# Patient Record
Sex: MALE | Race: WHITE | NOT HISPANIC OR LATINO | Employment: FULL TIME | URBAN - NONMETROPOLITAN AREA
[De-identification: names, ages, dates, MRNs, and addresses within clinical notes are randomized per-mention and may not be internally consistent; named-entity substitution may affect disease eponyms.]

---

## 2017-02-08 DIAGNOSIS — N40.1 BPH (BENIGN PROSTATIC HYPERTROPHY) WITH URINARY OBSTRUCTION: Primary | ICD-10-CM

## 2017-02-08 DIAGNOSIS — N13.8 BPH (BENIGN PROSTATIC HYPERTROPHY) WITH URINARY OBSTRUCTION: Primary | ICD-10-CM

## 2017-02-08 LAB — PSA SERPL-MCNC: 0.45 NG/ML (ref 0–4)

## 2017-02-17 ENCOUNTER — OFFICE VISIT (OUTPATIENT)
Dept: UROLOGY | Facility: CLINIC | Age: 57
End: 2017-02-17

## 2017-02-17 VITALS
SYSTOLIC BLOOD PRESSURE: 130 MMHG | DIASTOLIC BLOOD PRESSURE: 80 MMHG | HEIGHT: 72 IN | WEIGHT: 232 LBS | TEMPERATURE: 96.7 F | BODY MASS INDEX: 31.42 KG/M2

## 2017-02-17 DIAGNOSIS — N40.1 BPH WITH URINARY OBSTRUCTION: Primary | ICD-10-CM

## 2017-02-17 DIAGNOSIS — N13.8 BPH WITH URINARY OBSTRUCTION: Primary | ICD-10-CM

## 2017-02-17 DIAGNOSIS — N43.40 SPERMATOCELE: ICD-10-CM

## 2017-02-17 LAB
BILIRUB BLD-MCNC: NEGATIVE MG/DL
CLARITY, POC: CLEAR
COLOR UR: YELLOW
GLUCOSE UR STRIP-MCNC: NEGATIVE MG/DL
KETONES UR QL: NEGATIVE
LEUKOCYTE EST, POC: NEGATIVE
NITRITE UR-MCNC: NEGATIVE MG/ML
PH UR: 6.5 [PH] (ref 5–8)
PROT UR STRIP-MCNC: NEGATIVE MG/DL
RBC # UR STRIP: NEGATIVE /UL
SP GR UR: 1.01 (ref 1–1.03)
UROBILINOGEN UR QL: NORMAL

## 2017-02-17 PROCEDURE — 81003 URINALYSIS AUTO W/O SCOPE: CPT | Performed by: UROLOGY

## 2017-02-17 PROCEDURE — 99213 OFFICE O/P EST LOW 20 MIN: CPT | Performed by: UROLOGY

## 2017-02-17 RX ORDER — MONTELUKAST SODIUM 10 MG/1
TABLET ORAL
COMMUNITY
Start: 2017-02-16

## 2017-02-17 RX ORDER — FLUTICASONE PROPIONATE 50 MCG
SPRAY, SUSPENSION (ML) NASAL
COMMUNITY
Start: 2016-11-15

## 2017-02-17 NOTE — PROGRESS NOTES
Subjective    Mr. De La Torre is 56 y.o. male    CHIEF COMPLAINT: BPH    The patient's symptoms are stable since we last saw him I he denies any gross hematuria no urinary tract infections no change in his voiding habits his AUA score today is 7.  He is not on any medications for his prostate.  His PSA today is 0.4 denies any urgency frequency nocturia etc.    He also has a spermatocele on the right hemiscrotum this is maybe slightly larger of the last year or 2 but still does not give me any pain or discomfort      History of Present Illness      The following portions of the patient's history were reviewed and updated as appropriate: allergies, current medications, past family history, past medical history, past social history, past surgical history and problem list.    Review of Systems   Constitutional: Negative for chills and fever.   Gastrointestinal: Negative for abdominal pain, anal bleeding and blood in stool.   Genitourinary: Negative for difficulty urinating, flank pain, frequency, hematuria, penile pain, penile swelling and urgency.         Current Outpatient Prescriptions:   •  fluticasone (FLONASE) 50 MCG/ACT nasal spray, , Disp: , Rfl:   •  montelukast (SINGULAIR) 10 MG tablet, , Disp: , Rfl:     Past Medical History   Diagnosis Date   • BPH (benign prostatic hypertrophy)    • Spermatocele        Past Surgical History   Procedure Laterality Date   • Colonoscopy         Social History     Social History   • Marital status: Single     Spouse name: N/A   • Number of children: N/A   • Years of education: N/A     Social History Main Topics   • Smoking status: Never Smoker   • Smokeless tobacco: None   • Alcohol use Yes   • Drug use: None   • Sexual activity: Not Asked     Other Topics Concern   • None     Social History Narrative   • None       Family History   Problem Relation Age of Onset   • Lung cancer Father    • Colon cancer Maternal Uncle    • Colon cancer Maternal Grandmother        Objective    Visit  "Vitals   • /80   • Temp 96.7 °F (35.9 °C)   • Ht 72\" (182.9 cm)   • Wt 232 lb (105 kg)   • BMI 31.46 kg/m2       Physical Exam   Constitutional: He is oriented to person, place, and time. He appears well-developed and well-nourished.   Pulmonary/Chest: Effort normal.   Abdominal: Soft. He exhibits no distension and no mass. There is no tenderness. There is no rebound and no guarding. No hernia. Hernia confirmed negative in the right inguinal area and confirmed negative in the left inguinal area.   Genitourinary: Penis normal. Rectal exam shows no mass, no tenderness and anal tone normal. Enlarged: for the age of the patient. Right testis shows mass. Right testis shows no swelling and no tenderness. Left testis shows no mass, no swelling and no tenderness. Circumcised. No hypospadias. No discharge found.   Genitourinary Comments:  The urethral meatus normal in position without evidence of stricture. Epididymis without mass or tenderness. Vas Deferens is palpably normal.Anus and perineum without mass or tenderness. The prostate is approximately 30 ml. It is Symmetric, with a Soft consistency. There are no nodules present. . The seminal vesicles are Not palpable due to the size of the prostate    There is a less than gauze ball size size right spermatocele above the testis is nontender.     Lymphadenopathy: No inguinal adenopathy noted on the right or left side.   Neurological: He is alert and oriented to person, place, and time.   Vitals reviewed.        Orders Only on 02/08/2017   Component Date Value Ref Range Status   • PSA 02/08/2017 0.450  0.000 - 4.000 ng/mL Final       Results for orders placed or performed in visit on 02/17/17   POC Urinalysis Dipstick, Automated   Result Value Ref Range    Color Yellow Yellow, Straw, Dark Yellow, Mirna    Clarity, UA Clear Clear    Glucose, UA Negative Negative, 1000 mg/dL (3+) mg/dL    Bilirubin Negative Negative    Ketones, UA Negative Negative    Specific Gravity  " 1.010 1.005 - 1.030    Blood, UA Negative Negative    pH, Urine 6.5 5.0 - 8.0    Protein, POC Negative Negative mg/dL    Urobilinogen, UA Normal Normal    Leukocytes Negative Negative    Nitrite, UA Negative Negative       Assessment and Plan    Andres was seen today for benign prostatic hypertrophy.    Diagnoses and all orders for this visit:    BPH with urinary obstruction  -     POC Urinalysis Dipstick, Automated    Spermatocele    Plan--from a BPH point review on was seen back again in 1 year with a PSA    He wondered whether this spermatocele would ever have to be operated on or not I told him really just depends weather starts giving him discomfort or gets very large size certainly many men will have this all lives did not bother them some will require surgery so really just time will tell.    I think I answered all his questions if he starts having increasing symptoms or size he will let me know    EMR Dragon/Transcription disclaimer:  Much of this encounter note is an electronic transcription/translation of spoken language to printed text. The electronic translation of spoken language may permit erroneous, or at times, nonsensical words or phrases to be inadvertently transcribed; although I have reviewed the note for such errors, some may still exist.

## 2017-02-22 ENCOUNTER — RESULTS ENCOUNTER (OUTPATIENT)
Dept: UROLOGY | Facility: CLINIC | Age: 57
End: 2017-02-22

## 2017-02-22 DIAGNOSIS — N40.1 BPH WITH URINARY OBSTRUCTION: ICD-10-CM

## 2017-02-22 DIAGNOSIS — N13.8 BPH WITH URINARY OBSTRUCTION: ICD-10-CM

## 2018-03-05 DIAGNOSIS — N40.1 BPH WITH URINARY OBSTRUCTION: Primary | ICD-10-CM

## 2018-03-05 DIAGNOSIS — N13.8 BPH WITH URINARY OBSTRUCTION: Primary | ICD-10-CM

## 2018-03-05 LAB — PSA SERPL-MCNC: 0.36 NG/ML (ref 0–4)

## 2018-03-12 ENCOUNTER — OFFICE VISIT (OUTPATIENT)
Dept: UROLOGY | Facility: CLINIC | Age: 58
End: 2018-03-12

## 2018-03-12 VITALS — TEMPERATURE: 96.5 F | WEIGHT: 230 LBS | HEIGHT: 72 IN | BODY MASS INDEX: 31.15 KG/M2

## 2018-03-12 DIAGNOSIS — N43.40 SPERMATOCELE: ICD-10-CM

## 2018-03-12 DIAGNOSIS — N13.8 BPH WITH URINARY OBSTRUCTION: Primary | ICD-10-CM

## 2018-03-12 DIAGNOSIS — N40.1 BPH WITH URINARY OBSTRUCTION: Primary | ICD-10-CM

## 2018-03-12 LAB
BILIRUB BLD-MCNC: NEGATIVE MG/DL
CLARITY, POC: CLEAR
COLOR UR: YELLOW
GLUCOSE UR STRIP-MCNC: NEGATIVE MG/DL
KETONES UR QL: NEGATIVE
LEUKOCYTE EST, POC: NEGATIVE
NITRITE UR-MCNC: NEGATIVE MG/ML
PH UR: 5.5 [PH] (ref 5–8)
PROT UR STRIP-MCNC: NEGATIVE MG/DL
RBC # UR STRIP: NEGATIVE /UL
SP GR UR: 1.02 (ref 1–1.03)
UROBILINOGEN UR QL: NORMAL

## 2018-03-12 PROCEDURE — 81001 URINALYSIS AUTO W/SCOPE: CPT | Performed by: UROLOGY

## 2018-03-12 PROCEDURE — 99213 OFFICE O/P EST LOW 20 MIN: CPT | Performed by: UROLOGY

## 2018-03-12 NOTE — PROGRESS NOTES
"Subjective    Mr. De La Torre is 57 y.o. male    CHIEF COMPLAINT: BPH    Patient comes in today for follow-up of BPH clinically he is doing very well as a way score today is 7 he has not any medications for his prostate he denies any gross hematuria there is no flank pain no burning stinging urgency frequency or worsening of his symptoms.    His PSA is stable 0.35678.    He also has a right spermatocele this is been stable as well has not been giving him any difficulties he has not noticed any change in size      History of Present Illness      The following portions of the patient's history were reviewed and updated as appropriate: allergies, current medications, past family history, past medical history, past social history, past surgical history and problem list.    Review of Systems   Constitutional: Negative for chills and fever.   Gastrointestinal: Negative for abdominal pain, anal bleeding and blood in stool.   Genitourinary: Negative for flank pain, frequency, hematuria and urgency.         Current Outpatient Prescriptions:   •  fluticasone (FLONASE) 50 MCG/ACT nasal spray, , Disp: , Rfl:   •  montelukast (SINGULAIR) 10 MG tablet, , Disp: , Rfl:     Past Medical History:   Diagnosis Date   • BPH (benign prostatic hypertrophy)    • Spermatocele        Past Surgical History:   Procedure Laterality Date   • COLONOSCOPY         Social History     Social History   • Marital status: Single     Social History Main Topics   • Smoking status: Never Smoker   • Smokeless tobacco: Never Used   • Alcohol use Yes   • Drug use: Unknown     Other Topics Concern   • Not on file       Family History   Problem Relation Age of Onset   • Lung cancer Father    • Colon cancer Maternal Uncle    • Colon cancer Maternal Grandmother        Objective    Temp 96.5 °F (35.8 °C)   Ht 182.9 cm (72\")   Wt 104 kg (230 lb)   BMI 31.19 kg/m²     Physical Exam   Constitutional: He is oriented to person, place, and time. He appears well-developed " and well-nourished.   Pulmonary/Chest: Effort normal.   Abdominal: Soft. He exhibits no distension and no mass. There is no tenderness. There is no rebound and no guarding. No hernia. Hernia confirmed negative in the right inguinal area and confirmed negative in the left inguinal area.   Genitourinary: Penis normal. Rectal exam shows no mass, no tenderness and anal tone normal. Enlarged: for the age of the patient. Right testis shows mass. Right testis shows no swelling and no tenderness. Left testis shows no mass, no swelling and no tenderness. Circumcised. No hypospadias. No discharge found.   Genitourinary Comments:  The urethral meatus normal in position without evidence of stricture. Epididymis without mass or tenderness. Vas Deferens is palpably normal.Anus and perineum without mass or tenderness. The prostate is approximately 30 ml. It is Symmetric, with a Soft consistency. There are no nodules present. . The seminal vesicles are Not palpable due to the size of the prostate    Stable rt spermatoceol  Testes sizqe.     Neurological: He is alert and oriented to person, place, and time.   Vitals reviewed.        Orders Only on 03/05/2018   Component Date Value Ref Range Status   • PSA 03/05/2018 0.362  0.000 - 4.000 ng/mL Final       Results for orders placed or performed in visit on 03/12/18   POC Urinalysis Dipstick, Automated   Result Value Ref Range    Color Yellow Yellow, Straw, Dark Yellow, Mirna    Clarity, UA Clear Clear    Glucose, UA Negative Negative, 1000 mg/dL (3+) mg/dL    Bilirubin Negative Negative    Ketones, UA Negative Negative    Specific Gravity  1.025 1.005 - 1.030    Blood, UA Negative Negative    pH, Urine 5.5 5.0 - 8.0    Protein, POC Negative Negative mg/dL    Urobilinogen, UA Normal Normal    Leukocytes Negative Negative    Nitrite, UA Negative Negative       Assessment and Plan    Andres was seen today for benign prostatic hypertrophy.    Diagnoses and all orders for this  visit:    BPH with urinary obstruction  -     POC Urinalysis Dipstick, Automated  -     PSA DIAGNOSTIC; Future    Spermatocele    Plan--patient is doing well from a BPH point review on was seen back again in 1 year with a PSA.    If his spermatocele grosser starts to bother him he will call

## 2018-03-17 ENCOUNTER — RESULTS ENCOUNTER (OUTPATIENT)
Dept: UROLOGY | Facility: CLINIC | Age: 58
End: 2018-03-17

## 2018-03-17 DIAGNOSIS — N40.1 BPH WITH URINARY OBSTRUCTION: ICD-10-CM

## 2018-03-17 DIAGNOSIS — N13.8 BPH WITH URINARY OBSTRUCTION: ICD-10-CM

## 2019-03-25 DIAGNOSIS — N40.1 BPH WITH URINARY OBSTRUCTION: ICD-10-CM

## 2019-03-25 DIAGNOSIS — N13.8 BPH WITH URINARY OBSTRUCTION: Primary | ICD-10-CM

## 2019-03-25 DIAGNOSIS — N13.8 BPH WITH URINARY OBSTRUCTION: ICD-10-CM

## 2019-03-25 DIAGNOSIS — N40.1 BPH WITH URINARY OBSTRUCTION: Primary | ICD-10-CM

## 2019-03-26 LAB — PSA SERPL-MCNC: 0.38 NG/ML (ref 0–4)

## 2019-04-02 ENCOUNTER — OFFICE VISIT (OUTPATIENT)
Dept: UROLOGY | Facility: CLINIC | Age: 59
End: 2019-04-02

## 2019-04-02 VITALS — WEIGHT: 235 LBS | BODY MASS INDEX: 31.83 KG/M2 | TEMPERATURE: 97.8 F | HEIGHT: 72 IN

## 2019-04-02 DIAGNOSIS — N40.1 BPH WITH URINARY OBSTRUCTION: Primary | ICD-10-CM

## 2019-04-02 DIAGNOSIS — N13.8 BPH WITH URINARY OBSTRUCTION: Primary | ICD-10-CM

## 2019-04-02 DIAGNOSIS — N43.40 SPERMATOCELE: ICD-10-CM

## 2019-04-02 LAB
BILIRUB BLD-MCNC: NEGATIVE MG/DL
CLARITY, POC: CLEAR
COLOR UR: YELLOW
GLUCOSE UR STRIP-MCNC: NEGATIVE MG/DL
KETONES UR QL: NEGATIVE
LEUKOCYTE EST, POC: NEGATIVE
NITRITE UR-MCNC: NEGATIVE MG/ML
PH UR: 5.5 [PH] (ref 5–8)
PROT UR STRIP-MCNC: NEGATIVE MG/DL
RBC # UR STRIP: NEGATIVE /UL
SP GR UR: 1.03 (ref 1–1.03)
UROBILINOGEN UR QL: NORMAL

## 2019-04-02 PROCEDURE — 99213 OFFICE O/P EST LOW 20 MIN: CPT | Performed by: UROLOGY

## 2019-04-02 PROCEDURE — 81001 URINALYSIS AUTO W/SCOPE: CPT | Performed by: UROLOGY

## 2019-04-02 RX ORDER — ROSUVASTATIN CALCIUM 5 MG/1
5 TABLET, COATED ORAL DAILY
Refills: 3 | COMMUNITY
Start: 2019-03-12

## 2019-04-02 RX ORDER — ASPIRIN 81 MG/1
81 TABLET ORAL DAILY
COMMUNITY

## 2019-04-02 RX ORDER — HYDROCHLOROTHIAZIDE 12.5 MG/1
12.5 CAPSULE, GELATIN COATED ORAL DAILY
Refills: 3 | COMMUNITY
Start: 2019-03-12

## 2019-04-02 NOTE — PROGRESS NOTES
Subjective    Mr. De La Torre is 58 y.o. male    CHIEF COMPLAINT: BPH    Patient comes back today for follow-up of BPH clinically he is doing very well his AUA score today is 7 he denies all medical gross hematuria no flank pain no burning stinging urgency frequency again his symptoms are stable he is on no medications for his prostate his PSA is stable also at 0.38.    He also has a history of a spermatocele the size of this is stable as well no increase in size no pain no discomfort and again I discussed this with him and told him what was etc. and I have answered all his questions regarding that      History of Present Illness      The following portions of the patient's history were reviewed and updated as appropriate: allergies, current medications, past family history, past medical history, past social history, past surgical history and problem list.    Review of Systems   Constitutional: Negative.  Negative for chills and fever.   Gastrointestinal: Negative for abdominal distention, abdominal pain, anal bleeding, blood in stool and nausea.   Genitourinary: Negative for difficulty urinating, dysuria, flank pain, frequency, hematuria and urgency.   Psychiatric/Behavioral: Negative.  Negative for agitation and confusion.         Current Outpatient Medications:   •  aspirin 81 MG EC tablet, Take 81 mg by mouth Daily., Disp: , Rfl:   •  fluticasone (FLONASE) 50 MCG/ACT nasal spray, , Disp: , Rfl:   •  hydrochlorothiazide (MICROZIDE) 12.5 MG capsule, Take 12.5 mg by mouth Daily., Disp: , Rfl: 3  •  montelukast (SINGULAIR) 10 MG tablet, , Disp: , Rfl:   •  rosuvastatin (CRESTOR) 5 MG tablet, Take 5 mg by mouth Daily., Disp: , Rfl: 3    Past Medical History:   Diagnosis Date   • BPH (benign prostatic hypertrophy)    • Spermatocele        Past Surgical History:   Procedure Laterality Date   • COLONOSCOPY         Social History     Socioeconomic History   • Marital status: Single     Spouse name: Not on file   • Number of  "children: Not on file   • Years of education: Not on file   • Highest education level: Not on file   Tobacco Use   • Smoking status: Never Smoker   • Smokeless tobacco: Never Used   Substance and Sexual Activity   • Alcohol use: Yes       Family History   Problem Relation Age of Onset   • Lung cancer Father    • Colon cancer Maternal Uncle    • Colon cancer Maternal Grandmother        Objective    Temp 97.8 °F (36.6 °C)   Ht 182.9 cm (72\")   Wt 107 kg (235 lb)   BMI 31.87 kg/m²     Physical Exam      Orders Only on 03/25/2019   Component Date Value Ref Range Status   • PSA 03/25/2019 0.383  0.000 - 4.000 ng/mL Final       Results for orders placed or performed in visit on 04/02/19   POC Urinalysis Dipstick, Multipro   Result Value Ref Range    Color Yellow Yellow, Straw, Dark Yellow, Mirna    Clarity, UA Clear Clear    Glucose, UA Negative Negative, 1000 mg/dL (3+) mg/dL    Bilirubin Negative Negative    Ketones, UA Negative Negative    Specific Gravity  1.030 1.005 - 1.030    Blood, UA Negative Negative    pH, Urine 5.5 5.0 - 8.0    Protein, POC Negative Negative mg/dL    Urobilinogen, UA Normal Normal    Nitrite, UA Negative Negative    Leukocytes Negative Negative     Patient's Body mass index is 31.87 kg/m². BMI is above normal parameters. Recommendations include: educational material.  Assessment and Plan    Diagnoses and all orders for this visit:    BPH with urinary obstruction  -     POC Urinalysis Dipstick, Multipro  -     PSA DIAGNOSTIC; Future    Spermatocele  -     POC Urinalysis Dipstick, Multipro    Plan--I discussed the options with the patient at length again he is minimally symptomatic he would just like to continue observation so we will see him back again in 1 year with a PSA.    Is a sperm was met as she will should continue to increase in size or give him trouble he will call prior to that  "

## 2019-04-02 NOTE — PATIENT INSTRUCTIONS

## 2023-11-17 ENCOUNTER — HOSPITAL ENCOUNTER (OUTPATIENT)
Dept: ULTRASOUND IMAGING | Facility: HOSPITAL | Age: 63
Discharge: HOME | End: 2023-11-17
Admitting: PHYSICIAN ASSISTANT
Payer: COMMERCIAL

## 2023-11-17 ENCOUNTER — PATIENT ROUNDING (BHMG ONLY) (OUTPATIENT)
Dept: UROLOGY | Facility: CLINIC | Age: 63
End: 2023-11-17
Payer: COMMERCIAL

## 2023-11-17 ENCOUNTER — OFFICE VISIT (OUTPATIENT)
Dept: UROLOGY | Facility: CLINIC | Age: 63
End: 2023-11-17
Payer: COMMERCIAL

## 2023-11-17 VITALS — HEIGHT: 72 IN | WEIGHT: 224.2 LBS | BODY MASS INDEX: 30.37 KG/M2 | TEMPERATURE: 97 F

## 2023-11-17 DIAGNOSIS — N43.40 SPERMATOCELE: Primary | ICD-10-CM

## 2023-11-17 DIAGNOSIS — N40.1 BPH WITH OBSTRUCTION/LOWER URINARY TRACT SYMPTOMS: ICD-10-CM

## 2023-11-17 DIAGNOSIS — N13.8 BPH WITH OBSTRUCTION/LOWER URINARY TRACT SYMPTOMS: ICD-10-CM

## 2023-11-17 DIAGNOSIS — R39.15 URGENCY OF URINATION: ICD-10-CM

## 2023-11-17 PROCEDURE — 76870 US EXAM SCROTUM: CPT

## 2023-11-17 RX ORDER — TAMSULOSIN HYDROCHLORIDE 0.4 MG/1
1 CAPSULE ORAL NIGHTLY
Qty: 90 CAPSULE | Refills: 3 | Status: SHIPPED | OUTPATIENT
Start: 2023-11-17 | End: 2024-11-11

## 2023-11-17 RX ORDER — ASCORBIC ACID 125 MG
TABLET,CHEWABLE ORAL
COMMUNITY
Start: 2023-11-12

## 2023-11-17 NOTE — PROGRESS NOTES
Subjective    Mr. De La Torre is 63 y.o. male    Chief Complaint: Spermatocele/urgency and other voiding complaints    History of Present Illness  Patient is a 63-year-old gentleman who has seen our group in the past it has been several years he saw Dr. Cantor.  He has few complaints his main complaint is the last few months he is having increasing urgency and urgency to the point he has discomfort in his bladder area.  He does get up at night but this is variable he also has a weaker stream and flow states it takes him several times to completely empty.  He is on no urologic medications at this time.  No personal or family history of prostate cancer most recent PSA of 0.5 done July 2023.  PSA 0.5 (7/14/2023)    Patient also with scrotal swelling which she has had for years he has a side epididymal cyst versus [place] which is easily seen on the ultrasound which she got today.  His same dimensions are slightly bigger than the testicle however he does not have any pain and does not find it overly cumbersome at this time.  Also monitoring left hydrocele which is not bothersome.  He is not interested in any surgical intervention at this time left to get worse.    The following portions of the patient's history were reviewed and updated as appropriate: allergies, current medications, past family history, past medical history, past social history, past surgical history and problem list.    Review of Systems   Genitourinary:  Positive for difficulty urinating and frequency.         Current Outpatient Medications:     aspirin 81 MG EC tablet, Take 1 tablet by mouth Daily., Disp: , Rfl:     calcium carbonate (TUMS ULTRA) 1000 MG chewable tablet, , Disp: , Rfl:     FEXOFENADINE HCL PO, , Disp: , Rfl:     fluticasone (FLONASE) 50 MCG/ACT nasal spray, , Disp: , Rfl:     hydrochlorothiazide (MICROZIDE) 12.5 MG capsule, Take 1 capsule by mouth Daily., Disp: , Rfl: 3    Menaquinone-7 (Vitamin K2) 100 MCG capsule, , Disp: , Rfl:      "montelukast (SINGULAIR) 10 MG tablet, , Disp: , Rfl:     rosuvastatin (CRESTOR) 5 MG tablet, Take 1 tablet by mouth Daily., Disp: , Rfl: 3    tamsulosin (FLOMAX) 0.4 MG capsule 24 hr capsule, Take 1 capsule by mouth Every Night for 360 days., Disp: 90 capsule, Rfl: 3    Past Medical History:   Diagnosis Date    BPH (benign prostatic hypertrophy)     Hyperlipidemia     Spermatocele        Past Surgical History:   Procedure Laterality Date    COLONOSCOPY         Social History     Socioeconomic History    Marital status: Single   Tobacco Use    Smoking status: Never    Smokeless tobacco: Never   Substance and Sexual Activity    Alcohol use: Yes     Comment: occ    Drug use: No    Sexual activity: Defer       Family History   Problem Relation Age of Onset    Lung cancer Father     Colon cancer Maternal Uncle     Colon cancer Maternal Grandmother        Objective    Temp 97 °F (36.1 °C)   Ht 182.9 cm (72\")   Wt 102 kg (224 lb 3.2 oz)   BMI 30.41 kg/m²     Physical Exam  Vitals reviewed.   Constitutional:       Appearance: Normal appearance.   HENT:      Head: Normocephalic and atraumatic.   Pulmonary:      Effort: Pulmonary effort is normal.   Genitourinary:     Comments: Digital rectal exam revealed a smooth symmetric prostate no special lesions nodules asymmetry or firmness were palpated.  Neurological:      Mental Status: He is alert and oriented to person, place, and time.   Psychiatric:         Mood and Affect: Mood normal.         Behavior: Behavior normal.             Results for orders placed or performed in visit on 11/17/23   POC Urinalysis Dipstick, Multipro    Specimen: Urine   Result Value Ref Range    Color Yellow Yellow, Straw, Dark Yellow, Mirna    Clarity, UA Clear Clear    Glucose, UA Negative Negative mg/dL    Bilirubin Negative Negative    Ketones, UA Negative Negative    Specific Gravity  1.005 1.005 - 1.030    Blood, UA Negative Negative    pH, Urine 5.5 5.0 - 8.0    Protein, POC Negative " Negative mg/dL    Urobilinogen, UA Normal Normal, 0.2 E.U./dL    Nitrite, UA Negative Negative    Leukocytes Negative Negative     Scrotal ultrasound independent review:  Moderate left-sided hydrocele no testicular masses no orchitis or epididymitis no testicular torsion.  Right epididymal cyst versus spermatocele no solid lesions.  Assessment and Plan    Diagnoses and all orders for this visit:    1. Spermatocele (Primary)    2. Urgency of urination  -     tamsulosin (FLOMAX) 0.4 MG capsule 24 hr capsule; Take 1 capsule by mouth Every Night for 360 days.  Dispense: 90 capsule; Refill: 3    3. BPH with obstruction/lower urinary tract symptoms    Patient presents as a new patient primarily for follow-up on spermatocele which is benign-appearing on ultrasound it could be a septated spermatocele or epididymal cyst that is similar dimension if not slightly larger than the testicle it is not causing him pain he does not or is not bothered by this for now we will just not recommend anything surgical we will just continue to watch and he states he is not interested in any surgical intervention unless it should get worse.    His main reason for coming was worsening voiding symptoms he has a combination of increased stream and flow along with feel like he has to go several times to empty as well as an increased urgency.  After discussion we will go ahead and start him on tamsulosin which I feel will help with his symptoms he will return in 3 months to discuss symptoms at that time.

## 2023-11-17 NOTE — PROGRESS NOTES
November 17, 2023    Hello, may I speak with Andres De La Torre?    My name is Mallorie      I am  with [facility]  [address], [address]  [place] [address] [phone].    Before we get started may I verify your date of birth? 1960    I am calling to officially welcome you to our practice and ask about your recent visit. Is this a good time to talk? yes    Tell me about your visit with us. What things went well?  Visit went well but I am not a new patient. I was seen here a few years ago.       We're always looking for ways to make our patients' experiences even better. Do you have recommendations on ways we may improve?  no    Overall were you satisfied with your first visit to our practice? yes       I appreciate you taking the time to speak with me today. Is there anything else I can do for you? no      Thank you, and have a great day.

## 2024-02-16 ENCOUNTER — OFFICE VISIT (OUTPATIENT)
Dept: UROLOGY | Facility: CLINIC | Age: 64
End: 2024-02-16
Payer: COMMERCIAL

## 2024-02-16 VITALS — BODY MASS INDEX: 30.75 KG/M2 | WEIGHT: 227 LBS | TEMPERATURE: 96 F | HEIGHT: 72 IN

## 2024-02-16 DIAGNOSIS — N13.8 BPH WITH OBSTRUCTION/LOWER URINARY TRACT SYMPTOMS: Primary | ICD-10-CM

## 2024-02-16 DIAGNOSIS — N40.1 BPH WITH OBSTRUCTION/LOWER URINARY TRACT SYMPTOMS: Primary | ICD-10-CM

## 2024-09-02 DIAGNOSIS — R39.15 URGENCY OF URINATION: ICD-10-CM

## 2024-09-03 RX ORDER — TAMSULOSIN HYDROCHLORIDE 0.4 MG/1
1 CAPSULE ORAL NIGHTLY
Qty: 90 CAPSULE | Refills: 3 | Status: SHIPPED | OUTPATIENT
Start: 2024-09-03

## 2025-01-17 NOTE — PROGRESS NOTES
Subjective    Mr. De La Torre is 64 y.o. male    Chief Complaint: BPH    History of Present Illness  Patient here for annual follow-up with history of BPH with symptoms.  He was having worsening urgency frequency and weaker stream.  He started tamsulosin and continues to have excellent response essentially resolved symptoms.  He has had a PSA drawn through physicals by supplier and stated the last one was 0.4.  Getting close to needing refills on tamsulosin.  He has no new complaints or symptoms.  AUA score 4 out of 35.    The following portions of the patient's history were reviewed and updated as appropriate: allergies, current medications, past family history, past medical history, past social history, past surgical history and problem list.    Review of Systems   Genitourinary:  Negative for difficulty urinating, frequency and urgency.         Current Outpatient Medications:     aspirin 81 MG EC tablet, Take 1 tablet by mouth Daily., Disp: , Rfl:     calcium carbonate (TUMS ULTRA) 1000 MG chewable tablet, , Disp: , Rfl:     FEXOFENADINE HCL PO, , Disp: , Rfl:     fluticasone (FLONASE) 50 MCG/ACT nasal spray, , Disp: , Rfl:     hydrochlorothiazide (MICROZIDE) 12.5 MG capsule, Take 1 capsule by mouth Daily., Disp: , Rfl: 3    Menaquinone-7 (Vitamin K2) 100 MCG capsule, , Disp: , Rfl:     montelukast (SINGULAIR) 10 MG tablet, , Disp: , Rfl:     rosuvastatin (CRESTOR) 5 MG tablet, Take 1 tablet by mouth Daily., Disp: , Rfl: 3    tamsulosin (FLOMAX) 0.4 MG capsule 24 hr capsule, TAKE 1 CAPSULE BY MOUTH EVERY NIGHT, Disp: 90 capsule, Rfl: 3    tamsulosin (FLOMAX) 0.4 MG capsule 24 hr capsule, Take 1 capsule by mouth Every Night for 360 days., Disp: 90 capsule, Rfl: 3    Past Medical History:   Diagnosis Date    BPH (benign prostatic hypertrophy)     Hyperlipidemia     Spermatocele        Past Surgical History:   Procedure Laterality Date    COLONOSCOPY         Social History     Socioeconomic History    Marital status:  Single   Tobacco Use    Smoking status: Never    Smokeless tobacco: Never   Substance and Sexual Activity    Alcohol use: Yes     Alcohol/week: 6.0 standard drinks of alcohol     Types: 6 Cans of beer per week     Comment: occ    Drug use: Never    Sexual activity: Not Currently       Family History   Problem Relation Age of Onset    Lung cancer Father     Cancer Father         Lung cancer    Colon cancer Maternal Uncle     Colon cancer Maternal Grandmother     Cancer Paternal Grandmother         Colon Cancer       Objective    There were no vitals taken for this visit.    Physical Exam  Constitutional:       Appearance: Normal appearance.   HENT:      Head: Normocephalic and atraumatic.   Pulmonary:      Effort: Pulmonary effort is normal.   Skin:     Coloration: Skin is not pale.   Neurological:      Mental Status: He is alert.   Psychiatric:         Mood and Affect: Mood normal.         Behavior: Behavior normal.             Results for orders placed or performed in visit on 01/24/25   POC Urinalysis Dipstick, Multipro    Collection Time: 01/24/25  2:19 PM    Specimen: Urine   Result Value Ref Range    Color Yellow Yellow, Straw, Dark Yellow, Mirna    Clarity, UA Clear Clear    Glucose, UA Negative Negative mg/dL    Bilirubin Negative Negative    Ketones, UA Negative Negative    Specific Gravity  1.010 1.005 - 1.030    Blood, UA Negative Negative    pH, Urine 6.0 5.0 - 8.0    Protein, POC Negative Negative mg/dL    Urobilinogen, UA 0.2 E.U./dL Normal, 0.2 E.U./dL    Nitrite, UA Negative Negative    Leukocytes Negative Negative     IPSS Questionnaire (AUA-7):  Incomplete emptying  Over the past month, how often have you had a sensation of not emptying your bladder completely after you finished urinating?: Not at all (01/24/25 1417)  Frequency  Over the past month, how often have you had to urinate again less than two hours after you finishied urinating ?: Less than 1 time in 5 (01/24/25 1417)  Intermittency  Over  the past month, how often have you found you stopped and started again several time when you urinated ?: Less than 1 time in 5 (01/24/25 1417)  Urgency  Over the last month, how often have you found it difficult  have you found it difficult to postpone urination ?: Less than 1 time in 5 (01/24/25 1417)  Weak Stream  Over the past month, how often have you had a weak urinary stream ?: Not at all (01/24/25 1417)  Straining  Over the past month, how often have you had to push or strain to begin urination ?: Not at all (01/24/25 1417)  Nocturia  Over the past month, how many times did you most typically get up to urinate from the time you went to bed until the time you got up in the morning ?: 1 time (01/24/25 1417)  Quality of life due to urinary symptoms  If you were to spend the rest of your life with your urinary condition the way it is now, how would feel about that?: Delighted (01/24/25 1417)    Scores  Total IPSS Score: 4 (01/24/25 1417)  Total Score = Symptomatic Level: Mildly symptomatic: 0-7 (01/24/25 1417)       Assessment and Plan    Diagnoses and all orders for this visit:    1. BPH with obstruction/lower urinary tract symptoms (Primary)  -     POC Urinalysis Dipstick, Multipro  -     tamsulosin (FLOMAX) 0.4 MG capsule 24 hr capsule; Take 1 capsule by mouth Every Night for 360 days.  Dispense: 90 capsule; Refill: 3      Patient continues to have excellent response to the tamsulosin which I refilled today.  He gets PSA drawn through his employers physical last PSA was 0.4.  AUA score is 4 out of 35.    Follow-up 1 year.

## 2025-01-24 ENCOUNTER — OFFICE VISIT (OUTPATIENT)
Dept: UROLOGY | Facility: CLINIC | Age: 65
End: 2025-01-24
Payer: COMMERCIAL

## 2025-01-24 DIAGNOSIS — N13.8 BPH WITH OBSTRUCTION/LOWER URINARY TRACT SYMPTOMS: Primary | ICD-10-CM

## 2025-01-24 DIAGNOSIS — N40.1 BPH WITH OBSTRUCTION/LOWER URINARY TRACT SYMPTOMS: Primary | ICD-10-CM

## 2025-01-24 LAB
BILIRUB BLD-MCNC: NEGATIVE MG/DL
CLARITY, POC: CLEAR
COLOR UR: YELLOW
GLUCOSE UR STRIP-MCNC: NEGATIVE MG/DL
KETONES UR QL: NEGATIVE
LEUKOCYTE EST, POC: NEGATIVE
NITRITE UR-MCNC: NEGATIVE MG/ML
PH UR: 6 [PH] (ref 5–8)
PROT UR STRIP-MCNC: NEGATIVE MG/DL
RBC # UR STRIP: NEGATIVE /UL
SP GR UR: 1.01 (ref 1–1.03)
UROBILINOGEN UR QL: NORMAL

## 2025-01-24 RX ORDER — TAMSULOSIN HYDROCHLORIDE 0.4 MG/1
1 CAPSULE ORAL NIGHTLY
Qty: 90 CAPSULE | Refills: 3 | Status: SHIPPED | OUTPATIENT
Start: 2025-01-24 | End: 2026-01-19